# Patient Record
Sex: MALE | Race: WHITE | NOT HISPANIC OR LATINO
[De-identification: names, ages, dates, MRNs, and addresses within clinical notes are randomized per-mention and may not be internally consistent; named-entity substitution may affect disease eponyms.]

---

## 2019-04-04 ENCOUNTER — TRANSCRIPTION ENCOUNTER (OUTPATIENT)
Age: 29
End: 2019-04-04

## 2022-06-20 ENCOUNTER — NON-APPOINTMENT (OUTPATIENT)
Age: 32
End: 2022-06-20

## 2022-06-20 DIAGNOSIS — G80.8 OTHER CEREBRAL PALSY: ICD-10-CM

## 2022-06-20 DIAGNOSIS — Z87.09 PERSONAL HISTORY OF OTHER DISEASES OF THE RESPIRATORY SYSTEM: ICD-10-CM

## 2022-06-20 DIAGNOSIS — Z78.9 OTHER SPECIFIED HEALTH STATUS: ICD-10-CM

## 2022-06-20 DIAGNOSIS — Z80.8 FAMILY HISTORY OF MALIGNANT NEOPLASM OF OTHER ORGANS OR SYSTEMS: ICD-10-CM

## 2022-06-20 PROBLEM — Z00.00 ENCOUNTER FOR PREVENTIVE HEALTH EXAMINATION: Status: ACTIVE | Noted: 2022-06-20

## 2022-06-27 ENCOUNTER — NON-APPOINTMENT (OUTPATIENT)
Age: 32
End: 2022-06-27

## 2022-06-27 ENCOUNTER — APPOINTMENT (OUTPATIENT)
Dept: GASTROENTEROLOGY | Facility: CLINIC | Age: 32
End: 2022-06-27
Payer: COMMERCIAL

## 2022-06-27 VITALS
WEIGHT: 182 LBS | HEIGHT: 67.5 IN | HEART RATE: 68 BPM | DIASTOLIC BLOOD PRESSURE: 82 MMHG | BODY MASS INDEX: 28.23 KG/M2 | SYSTOLIC BLOOD PRESSURE: 118 MMHG

## 2022-06-27 PROCEDURE — 99204 OFFICE O/P NEW MOD 45 MIN: CPT

## 2022-06-27 NOTE — ASSESSMENT
[FreeTextEntry1] : \par 1. Abd pain PC\par     + Regurg, throat clear\par      On H2Bs\par R/O PUD\par        Gastritis\par        Erosive GERD/ Barretts\par      \par P:  anti-reflux daiet and life style changes\par       No ETOH/ NSAIDS\par       Omep 20mg wg\par       EGD  to r/o the above mentioned possibilities\par \par \par \par \par \par 1. GERD:   clinically active w  ht burn, occas throat clearing,  No  dysphagia,  \par * + clinical  LPR,  R/O Carey’s  or h/o  Esophagitis grade: A-- \par \par Recommend: \par * Anti-reflux diet & life-style changes reviewed & re-emphasized.  \par * HOB elevation /  Bedge use emphasized\par * Weight reduction & regular exercise emphasized\par \par * ++ PPI use :  omep 20mgf qd       --  as needed was emphasized\par No need for  H2B q HS:  \par No need for Carafate  1 gram \par I previously reviewed & summarized the prospective randomized & retrospective non-randomized studies\par looking at potential long term SE's of PPIs, w special attention to associations & actual cause\par as related to GI infections, bone loss, cognitive changes, KD, Covid, vitamin & electrolyte deficiencies\par questions were answered, pt advised that PPIs should be used when needed as indicated by their\par clinical indication and response and tapering off is always the goal if possible. pt understood.\par \par * No  need for pH Monitor,  Manometry, or  Esophagram \par *  ++  need for ENT  eval/F/U, \par * No  need for  Surgical  eval  \par \par * for F/U  EGD: --  top r/o the above mentioned possibilities \par \par \par \par \par \par \par 2. Irritable Bowel Syndrome:  well - controlled.    constipation,  Occas  diarrhea,  no bloat\par Recommend: \par * Diet: moderate  Fiber,  Low Fat & Lactose free, Low FODMAPs--was reviewed & emphasized\par * Daily fluid intake was reviewed : 6  --  8 cups of decaffeinated fluid daily was emphasized\par * Regular aerobic exercise was emphasized\par * Probiotics  1  daily\par * Neuromodulation: not required \par \par \par \par \par \par \par 3. Hemorrhoids:  well - controlled.  No pain,  swelling,  itch,  bleeding\par * Discussed  the  potential complications of thrombosis,  pain,  infection,  swelling, itching,  bleeding --none recently\par Recommendations: \par * Moderate-  Fiber Diet was reviewed and emphasized\par * 6  --  8 cups of decaffeinated fluid daily was emphasized \par * Sitz Bathes as needed ,  No:  Anusol HC  Suppos / Cream  [place] BID -- was needed\par * No:  Tucks BID,  Balneol Lotion,   Calmoseptine Oint -- was needed ;    can use  Prep H prn\par * No:  need for  Colorectal surgical evaluation for possible ablation\par \par \par \par \par \par \par 4. Colorectal  Neoplasia  Screening:  to be evaluated\par   Utilizing teaching posters and anatomical models the following were discussed and emphasized with the patient in detail: \par * Discussed the pre-malignant potential of polyps\par * Discussed the importance of f/u surveillance / screening colonoscopy \par * moderate- Fiber Diet was reviewed and emphasized\par * Anti-oxidants and ASA/NSAID Therapy emphasized\par * Given age > 40-49 yo\par * Recommend Colonoscopy  to  R/O  Colonic Neoplasia-- in 2022\par \par \par \par \par Informed Consent:\par * The risks & Benefits of EGD  /  Colonoscopy were discussed w patient.\par * This included but was not limited to perforation, bleeding, sedation /med rxns possibly requiring surgery, blood transfusions, antibiotics & CPR/Intubation.\par * Pt. understands & agrees to the procedures.\par The following instructions in regards to the prep and medically essential ( cardiac, pulmonary, sz, psych, endocrine)  pre-op medication administration\par was reviewed and emphasized with the patient . \par * Pt. advised to D/C  ASA/NSAIDs  7  Days   PTP.\par * [ +++ ]  Dulcolax / Miralax / Mag. Citrate ,  [     ] Prepopik/ Clenpiq ,  [     ] Osmo Prep,  [    ] GoLytely,  prep. reviewed w Pt.\par * Hold  [           ] AM of procedure.\par * Hold  [           ] PM  before procedure.\par * Take  [           ] PM  before procedure.\par * Take  [           ] AM of procedure.\par \par

## 2022-06-27 NOTE — HISTORY OF PRESENT ILLNESS
[de-identified] : \par \par This HPI reflects a summary and review of records : including previous and most recent  Labs, body imaging, consults and progress notes, operative and pathology reports, EKG reports, ED records, found in [facility], [facility],  [facility] and any additional records brought in by  the patient at the time of the visit.\par \par PCP:  Star\par \par 32 yo M w h/o CP w R. HP, Asthma, Allergies\par + H/O Snoring, neg DTF,  BMI=   ,   neck=16   ,  STOP-Bang= 2    ,  Mallaampatti=4\par Colon Polyps, IBS, Hemorrhoids\par GERD\par \par 2018 Init CC:  Long h/o " sensitive stomach" as a teen, Lower abd cramping and LBMs\par usu # 4-5, 3x/D,  when flare # 5-6, 3-5x/D; usus triggers: fat, caffeine, stress\par Also h/o GERD:  epigastric burn--> chest, w belch, acid taste and throat clearing, RX w pepcid\par prior Colonoscopy w 1 cm ped rectal polyp: Adenoma, 2n deg in hemorrhoids, random BX: wnl\par \par  reviewed endoscopic findings and pathology in detail with patient:  Colonoscopy: \par all of the patients questions were addressed and answered\par \par 6/27/22    Today:  Feeling well, no c/o , CP, SOB/ DEMARCO, Cough, Wheeze, Palpitations, edema\par \par   Today:  Recently having Epigastric pain--> chest/ throat\par                Taking Pepcid AC which sometime helps\par                Usu PC, No N, early satiety, Melena or NSAIDs\par                +Regurg, throat clearing,  occas hoasreness\par                BMs: usu # 4 , 3-4x/ D, occas w stress, caffeine, fat--> # 5-6 , 3-5 x /D usu in am \par \par * Abd pain-->yes\par * Nausea--> no\par * Vomit--> no\par * Early satiety--> no\par * Belching--> no\par * Hiccups--> no\par * Regurgitation--> yes\par * Acid Taste / Water Brash--> no\par * Ht burn-->yes\par * Dysphagia--> no\par * Throat Clearing--> yes\par * Hoarseness--> occas \par * Post-Nasal Drip--> no\par * Congestion--> no\par * Globus--> no\par * Cough--> no\par * Wheeze / PC-> -no\par * Constipation--> no\par * Bloating--> no\par * Strain on Defecation--> no\par * Incompl Evac--> no\par * Flatulence--> no\par * Gurgling--> no\par * Melena--> no\par * BPBPR-> -no\par * Anorexia--> no\par * Wt. Loss--> no\par \par

## 2022-08-23 ENCOUNTER — TRANSCRIPTION ENCOUNTER (OUTPATIENT)
Age: 32
End: 2022-08-23

## 2022-08-23 ENCOUNTER — RESULT REVIEW (OUTPATIENT)
Age: 32
End: 2022-08-23

## 2022-08-25 ENCOUNTER — RESULT REVIEW (OUTPATIENT)
Age: 32
End: 2022-08-25

## 2022-08-26 ENCOUNTER — APPOINTMENT (OUTPATIENT)
Dept: GASTROENTEROLOGY | Facility: HOSPITAL | Age: 32
End: 2022-08-26

## 2023-01-01 ENCOUNTER — NON-APPOINTMENT (OUTPATIENT)
Age: 33
End: 2023-01-01

## 2023-02-14 ENCOUNTER — APPOINTMENT (OUTPATIENT)
Dept: GASTROENTEROLOGY | Facility: CLINIC | Age: 33
End: 2023-02-14

## 2023-08-22 ENCOUNTER — APPOINTMENT (OUTPATIENT)
Dept: PODIATRY | Facility: CLINIC | Age: 33
End: 2023-08-22
Payer: COMMERCIAL

## 2023-08-22 ENCOUNTER — RESULT REVIEW (OUTPATIENT)
Age: 33
End: 2023-08-22

## 2023-08-22 DIAGNOSIS — M77.8 OTHER ENTHESOPATHIES, NOT ELSEWHERE CLASSIFIED: ICD-10-CM

## 2023-08-22 PROCEDURE — 99203 OFFICE O/P NEW LOW 30 MIN: CPT

## 2023-08-22 RX ORDER — OMEPRAZOLE 40 MG/1
40 CAPSULE, DELAYED RELEASE ORAL
Qty: 90 | Refills: 3 | Status: DISCONTINUED | COMMUNITY
Start: 2022-08-26 | End: 2023-08-22

## 2023-08-22 NOTE — HISTORY OF PRESENT ILLNESS
[FreeTextEntry1] : Location: top of the left GT joint Duration: a few months Etiology: unknown Past Tx: none Exacerbated by: forced flexion Prior Hx: none Patient has right sided CP

## 2023-08-22 NOTE — PROCEDURE
[FreeTextEntry1] : weight bearing x-rays ordered stat of left foot There the classic signs and symptoms of hallux limitus with dorsal spurring noted at the great toe joint both on the base of the proximal phalanx and the first metatarsal head. These are classic. There is crepitus, jamming, pain and associated extensor longus tendon tendinitis. A gait exam was performed and there appears to be program and supination approaching mid stance as the patient admits to referred pain to the lateral aspect of the foot due to an inability to dorsiflex the great toe joint.  I had a lengthy discussion with the patient all questions asked and answered appropriately regarding a diagnosis of tendinitis/tenosynovitis. I did explain to the patient etiology of this diagnosis as well as treatment options. It is an inflammation of the tendons on the top of the foot. In many cases both pressure as well as excessive motion can cause inflammation of the tendons and is treated with a combination of immobilization, anti-inflammatories, physical therapy, or combination of all of the above. Patient was given at home exercises to do and I did explain to them that there is a need for strict compliance to my chosen treatment options.All questions asked and answered appropriately to the patient's satisfaction. , A complete and thorough evaluation of the type of shoes they should be wearing and type of shoes for this time of year was discussed with patient.  Since the patient is currently taking non-steroidal anti-inflammatory medication I had a complete and thorough discussion with the patient regarding risks and benefits of these types of medications. The most common side effects include but are not limited to gastrointestinal upset, blood in the stool, nausea, diarrhea, as well as tinnitus. I have advised the patient that if they should experience any of the side effects that she discontinue them at once and contacted primary care physician for immediate followup. I did advise the patient that these types of medications can be taken on an as needed basis and if they are not having pain he should not take them. I also advised that the patient should follow the explicit instructions on the labile and not to exceed the recommended dosage.

## 2023-08-22 NOTE — REVIEW OF SYSTEMS
[Joint Stiffness] : joint stiffness [As Noted in HPI] : as noted in HPI [Negative] : Integumentary [Joint Swelling] : no joint swelling [Limb Pain] : no limb pain [Limb Swelling] : no limb swelling

## 2023-08-22 NOTE — PHYSICAL EXAM
[General Appearance - Alert] : alert [General Appearance - In No Acute Distress] : in no acute distress [Skin Color & Pigmentation] : normal skin color and pigmentation [Skin Turgor] : normal skin turgor [Skin Lesions] : no skin lesions [] : no rash [Sensation] : the sensory exam was normal to light touch and pinprick [No Focal Deficits] : no focal deficits [Deep Tendon Reflexes (DTR)] : deep tendon reflexes were 2+ and symmetric [Motor Exam] : the motor exam was normal [Oriented To Time, Place, And Person] : oriented to person, place, and time [Impaired Insight] : insight and judgment were intact [Affect] : the affect was normal [FreeTextEntry3] : Vascular exam reveals palpable pedal pulses, the foot is warm to touch, there was good capillary fill time, the skin is normal in appearance there is no evidence of vascular disease or compromise at this time [de-identified] : pain across the 1st MTP joint.... s and sx c/w hallux limitus [Foot Ulcer] : no foot ulcer [Skin Induration] : no skin induration

## 2023-09-27 ENCOUNTER — APPOINTMENT (OUTPATIENT)
Dept: PODIATRY | Facility: HOSPITAL | Age: 33
End: 2023-09-27

## 2023-10-10 ENCOUNTER — APPOINTMENT (OUTPATIENT)
Dept: PODIATRY | Facility: CLINIC | Age: 33
End: 2023-10-10
Payer: COMMERCIAL

## 2023-10-10 VITALS — HEIGHT: 67.5 IN | BODY MASS INDEX: 28.23 KG/M2 | WEIGHT: 182 LBS

## 2023-10-10 PROCEDURE — 11055 PARING/CUTG B9 HYPRKER LES 1: CPT

## 2023-10-10 PROCEDURE — 99213 OFFICE O/P EST LOW 20 MIN: CPT | Mod: 25

## 2024-01-31 ENCOUNTER — NON-APPOINTMENT (OUTPATIENT)
Age: 34
End: 2024-01-31

## 2024-02-11 ENCOUNTER — NON-APPOINTMENT (OUTPATIENT)
Age: 34
End: 2024-02-11

## 2024-02-21 ENCOUNTER — APPOINTMENT (OUTPATIENT)
Dept: PODIATRY | Facility: CLINIC | Age: 34
End: 2024-02-21
Payer: COMMERCIAL

## 2024-02-21 VITALS — WEIGHT: 170 LBS | BODY MASS INDEX: 26.37 KG/M2 | HEIGHT: 67.5 IN

## 2024-02-21 DIAGNOSIS — M89.9 DISORDER OF BONE, UNSPECIFIED: ICD-10-CM

## 2024-02-21 DIAGNOSIS — M20.5X2 OTHER DEFORMITIES OF TOE(S) (ACQUIRED), LEFT FOOT: ICD-10-CM

## 2024-02-21 PROCEDURE — 99213 OFFICE O/P EST LOW 20 MIN: CPT | Mod: 25

## 2024-02-21 PROCEDURE — 11055 PARING/CUTG B9 HYPRKER LES 1: CPT

## 2024-02-21 RX ORDER — CELECOXIB 200 MG/1
200 CAPSULE ORAL TWICE DAILY
Qty: 28 | Refills: 1 | Status: DISCONTINUED | COMMUNITY
Start: 2023-08-22 | End: 2024-02-21

## 2024-02-21 NOTE — HISTORY OF PRESENT ILLNESS
[FreeTextEntry1] : Location: left foot Duration: > 6 mos Etiology: ip sesamoid, limitus, CP right side Past Tx: debridement Exacerbated by: forced flexion

## 2024-02-21 NOTE — PROCEDURE
[FreeTextEntry1] : All diagnostic test, labs, imaging, prior notes and reports (both new and recent) reviewed prior to seeing the patient and discussed at length face to face with the patient. How these results pertain to the patient, their diagnosis and treatments also reviewed. All questions asked and answered appropriately. The risks and complications of not following my recommendations d/w the patient. Shaving and padding of a benign hyperkeratotic lesion follow up prn

## 2024-02-21 NOTE — PHYSICAL EXAM
[FreeTextEntry3] : Vascular exam reveals palpable pedal pulses, the foot is warm to touch, there was good capillary fill time, the skin is normal in appearance there is no evidence of vascular disease or compromise at this time [de-identified] :  weightbearing x-rays show IP sesamoid and a deep IPK which he states is now more the exact location of the pain and the problem. [Skin Color & Pigmentation] : normal skin color and pigmentation [Skin Turgor] : normal skin turgor [] : no rash [Foot Ulcer] : no foot ulcer [Skin Induration] : no skin induration [FreeTextEntry1] : Hyperkeratosis noted at the IP joint left plantar GT

## 2024-04-01 ENCOUNTER — APPOINTMENT (OUTPATIENT)
Dept: GASTROENTEROLOGY | Facility: CLINIC | Age: 34
End: 2024-04-01
Payer: COMMERCIAL

## 2024-04-01 VITALS
DIASTOLIC BLOOD PRESSURE: 74 MMHG | HEART RATE: 65 BPM | BODY MASS INDEX: 26.68 KG/M2 | SYSTOLIC BLOOD PRESSURE: 124 MMHG | HEIGHT: 67.5 IN | WEIGHT: 172 LBS

## 2024-04-01 DIAGNOSIS — K21.9 GASTRO-ESOPHAGEAL REFLUX DISEASE W/OUT ESOPHAGITIS: ICD-10-CM

## 2024-04-01 DIAGNOSIS — K63.5 POLYP OF COLON: ICD-10-CM

## 2024-04-01 DIAGNOSIS — L29.0 PRURITUS ANI: ICD-10-CM

## 2024-04-01 DIAGNOSIS — K58.0 IRRITABLE BOWEL SYNDROME WITH DIARRHEA: ICD-10-CM

## 2024-04-01 DIAGNOSIS — K64.8 OTHER HEMORRHOIDS: ICD-10-CM

## 2024-04-01 DIAGNOSIS — R10.13 EPIGASTRIC PAIN: ICD-10-CM

## 2024-04-01 DIAGNOSIS — Z12.11 ENCOUNTER FOR SCREENING FOR MALIGNANT NEOPLASM OF COLON: ICD-10-CM

## 2024-04-01 PROCEDURE — 99215 OFFICE O/P EST HI 40 MIN: CPT

## 2024-04-01 NOTE — ASSESSMENT
[FreeTextEntry1] :  Pruritis Ani--> after bout of Diarrhea                        Clinically had Viral GE  P:      Avoid manipulating : scratching/ scrubbing the area          avoid common dietary irritants: caffeine conatining foods/beverages, tomato-based products, lactose, ETOH--ewelina beer/red wine           gentle wash w warm water only , no soap, and pat dry          avoid: soaps, scented toilet paper, anesthetic creams          Balneol lotion as directed: on cotton balls          wear loose cotton undergarments          Diet:  mod fiber , low fodmaps:  avoid straining and or diarrhea    1. Abd pain PC--> better     + Regurg, throat clear--> better      On H2Bs  8/26/22 EGD: No Ulcer        Erosive GERD       P:  anti-reflux daiet and life style changes       No ETOH/ NSAIDS       Omep 20mg qd --> Omep 40mg qd --> taper ed off       On Pepcid AC prn              1. GERD:   better w less ht burn,  No  throat clearing,  No  dysphagia,   * + clinical  LPR,  No Carey's  But +  h/o  Esophagitis grade: A-  Recommend:  * Anti-reflux diet & life-style changes reviewed & re-emphasized.   * HOB elevation /  Bedge use emphasized * Weight reduction & regular exercise emphasized  * ++ PPI use :  omep 40mg  qd --> tapered off Pepcid AC prn  * No  need for pH Monitor,  Manometry, or  Esophagram  *  ++  need for ENT  eval/F/U,  * No  need for  Surgical  eval   * for F/U  EGD: --> 8/2025 to r/o Barretts       2. Irritable Bowel Syndrome:  well - controlled.  No constipation,  Occas  diarrhea,  no bloat feels better on GF diet  Recommend:  * Diet: moderate  Fiber,  Low Fat & Lactose free, Low FODMAPs--was reviewed & emphasized * Daily fluid intake was reviewed : 6  --  8 cups of decaffeinated fluid daily was emphasized * Regular aerobic exercise was emphasized * Probiotics  1  daily * Neuromodulation: not required        3. Hemorrhoids:  well - controlled.  No pain,  swelling,  itch,  bleeding * Discussed   previously the  potential complications of thrombosis,  pain,  infection,  swelling, itching,  bleeding --none recently Recommendations:  * Moderate-  Fiber Diet was  emphasized * 6  --  8 cups of decaffeinated fluid daily was emphasized  * Sitz Bathes as needed ,  No:  Anusol HC  Suppos / Cream  [place] BID -- was needed * No:  Tucks BID,  Balneol Lotion,   Calmoseptine Oint -- was needed ;    can use  Prep H prn * No:  need for  Colorectal surgical evaluation for possible ablation       4. Colorectal  Neoplasia  Screening:  well controlled    Utilizing teaching posters and anatomical models the following were discussed and emphasized with the patient in detail:  * Discussed the pre-malignant potential of polyps * Discussed the importance of f/u surveillance / screening colonoscopy  * moderate- Fiber Diet was  emphasized * Anti-oxidants and ASA/NSAID Therapy emphasized * Given age > 40-49 yo & +PH Colon Polyps  * Recommend Colonoscopy  to  R/O  Colonic Neoplasia-- in 2027

## 2024-04-01 NOTE — HISTORY OF PRESENT ILLNESS
[de-identified] : This HPI reflects a summary and review of records : including previous and most recent  Labs, body imaging, consults and progress notes, operative and pathology reports, EKG reports, ED records, found in [facility], [facility],  [facility] and any additional records brought in by  the patient at the time of the visit.  PCP:  Star  34 yo M w h/o CP w R. HP, Asthma, Allergies + H/O Snoring, neg DTF,  BMI=28,neck=16, STOP-Bang= 2 ,  Mallaampatti=4 Colon Polyps, IBS, Hemorrhoids GERD  2018 Init CC:  Long h/o " sensitive stomach" as a teen, Lower abd cramping and LBMs usu # 4-5, 3x/D,  when flare # 5-6, 3-5x/D; usu triggers: fat, caffeine, stress Also h/o GERD:  epigastric burn--> chest, w belch, acid taste and throat clearing, RX w pepcid prior Colonoscopy w 1 cm ped rectal polyp: Adenoma, 2n deg in hemorrhoids, random BX: wnl   6/27/22:   Recently having Epigastric pain--> chest/ throat                Taking Pepcid AC which sometime helps                Usu PC, No N, early satiety, Melena or NSAIDs                +Regurg, throat clearing,  occas hoasreness                BMs: usu # 4 , 3-4x/ D, occas w stress, caffeine, fat--> # 5-6 , 3-5 x /D usu in am  8/26/22 EGD: gastritis, No HP, No Ulcer; 3cm HH, Esophagitis A-, No Barretts              Colonoscopy: 2nd deg int hemorrhoids   reviewed endoscopic findings and pathology in detail with patient:  8/26/22 EGD/Colonoscopy   all of the patients questions were addressed and answered  4/1/24 :   Today no c/o , CP, SOB/ DEMARCO, Cough, Wheeze, Palpitations, edema                 recently had N/V/D x 7-10days, went to UC--> stool was neg                 Stool was # 6-7, no blood or mucous                 No fever, chills, recent ABX, travel                 Had gone GF, on Probiotic                 BMs: # 4, 1-3 x/D                  Now anus w some itching   BMs: #--> BMs: # 4, 1-3 x/D   Epigastric pain--> chest/ throat--> No * Ht burn-->occas, takes Pepcid  AC   Regurg--> No  throat clearing -->No  hoasreness-->No  * Abd pain-->No * Nausea--> no * Vomit--> no * Early satiety--> no * Belching--> no * Hiccups--> no * Acid Taste / Water Brash--> no * Dysphagia--> no * Post-Nasal Drip--> no * Congestion--> no * Globus--> no * Cough--> no * Wheeze / PC-> -no * Constipation--> no * Bloating--> no * Strain on Defecation--> no * Incompl Evac--> no * Flatulence--> no * Gurgling--> no * Melena--> no * BPBPR-> -no * Anorexia--> no * Wt. Loss--> no

## 2024-04-01 NOTE — REASON FOR VISIT
[Follow-up] : a follow-up of an existing diagnosis [FreeTextEntry1] : referred by Star  for GI Evaluation

## 2024-06-14 ENCOUNTER — APPOINTMENT (OUTPATIENT)
Dept: PODIATRY | Facility: CLINIC | Age: 34
End: 2024-06-14

## 2024-06-14 DIAGNOSIS — M25.872 OTHER SPECIFIED JOINT DISORDERS, LEFT ANKLE AND FOOT: ICD-10-CM

## 2024-06-14 DIAGNOSIS — L85.1 ACQUIRED KERATOSIS [KERATODERMA] PALMARIS ET PLANTARIS: ICD-10-CM

## 2024-06-14 PROCEDURE — 11055 PARING/CUTG B9 HYPRKER LES 1: CPT

## 2024-06-14 NOTE — PHYSICAL EXAM
[FreeTextEntry3] : Vascular exam reveals palpable pedal pulses, the foot is warm to touch, there was good capillary fill time, the skin is normal in appearance there is no evidence of vascular disease or compromise at this time [de-identified] :  weightbearing x-rays show IP sesamoid and a deep IPK which he states is now more the exact location of the pain and the problem. [Skin Color & Pigmentation] : normal skin color and pigmentation [Skin Turgor] : normal skin turgor [] : no rash [Foot Ulcer] : no foot ulcer [Skin Induration] : no skin induration [FreeTextEntry1] : Hyperkeratosis noted at the IP joint left plantar GT

## 2024-10-28 ENCOUNTER — APPOINTMENT (OUTPATIENT)
Dept: PODIATRY | Facility: CLINIC | Age: 34
End: 2024-10-28

## 2024-10-31 ENCOUNTER — APPOINTMENT (OUTPATIENT)
Dept: PODIATRY | Facility: CLINIC | Age: 34
End: 2024-10-31
Payer: COMMERCIAL

## 2024-10-31 DIAGNOSIS — L85.1 ACQUIRED KERATOSIS [KERATODERMA] PALMARIS ET PLANTARIS: ICD-10-CM

## 2024-10-31 DIAGNOSIS — M20.5X2 OTHER DEFORMITIES OF TOE(S) (ACQUIRED), LEFT FOOT: ICD-10-CM

## 2024-10-31 DIAGNOSIS — M25.872 OTHER SPECIFIED JOINT DISORDERS, LEFT ANKLE AND FOOT: ICD-10-CM

## 2024-10-31 PROCEDURE — 11055 PARING/CUTG B9 HYPRKER LES 1: CPT
